# Patient Record
Sex: MALE | Race: WHITE
[De-identification: names, ages, dates, MRNs, and addresses within clinical notes are randomized per-mention and may not be internally consistent; named-entity substitution may affect disease eponyms.]

---

## 2019-11-08 ENCOUNTER — HOSPITAL ENCOUNTER (OUTPATIENT)
Dept: HOSPITAL 60 - LB.CLINIC | Age: 24
End: 2019-11-08
Attending: FAMILY MEDICINE
Payer: COMMERCIAL

## 2019-11-08 DIAGNOSIS — R52: Primary | ICD-10-CM

## 2019-11-08 DIAGNOSIS — R53.83: ICD-10-CM

## 2019-11-09 NOTE — CR
DATE OF SERVICE:  11/08/2019 

CLINICAL DATA:   Pain, unspecified,Other fatigue.



PA AND LATERAL CHEST:



No priors.  



The heart size is normal.  



The lungs are clear.  No pneumothorax.  No pleural effusions.  



No evidence of acute intrathoracic disease.  



[id number]
[facility]